# Patient Record
Sex: MALE | Race: WHITE | NOT HISPANIC OR LATINO
[De-identification: names, ages, dates, MRNs, and addresses within clinical notes are randomized per-mention and may not be internally consistent; named-entity substitution may affect disease eponyms.]

---

## 2023-04-28 ENCOUNTER — TRANSCRIPTION ENCOUNTER (OUTPATIENT)
Age: 58
End: 2023-04-28

## 2023-05-15 PROBLEM — Z00.00 ENCOUNTER FOR PREVENTIVE HEALTH EXAMINATION: Status: ACTIVE | Noted: 2023-05-15

## 2023-05-16 ENCOUNTER — APPOINTMENT (OUTPATIENT)
Dept: NEUROLOGY | Facility: CLINIC | Age: 58
End: 2023-05-16
Payer: MEDICARE

## 2023-05-16 VITALS
HEART RATE: 63 BPM | BODY MASS INDEX: 22.35 KG/M2 | SYSTOLIC BLOOD PRESSURE: 106 MMHG | HEIGHT: 72 IN | DIASTOLIC BLOOD PRESSURE: 66 MMHG | WEIGHT: 165 LBS

## 2023-05-16 DIAGNOSIS — Z86.39 PERSONAL HISTORY OF OTHER ENDOCRINE, NUTRITIONAL AND METABOLIC DISEASE: ICD-10-CM

## 2023-05-16 DIAGNOSIS — Z86.59 PERSONAL HISTORY OF OTHER MENTAL AND BEHAVIORAL DISORDERS: ICD-10-CM

## 2023-05-16 DIAGNOSIS — F31.70 BIPOLAR DISORDER, CURRENTLY IN REMISSION, MOST RECENT EPISODE UNSPECIFIED: ICD-10-CM

## 2023-05-16 PROCEDURE — 99215 OFFICE O/P EST HI 40 MIN: CPT

## 2023-05-17 PROBLEM — F31.70 HISTORY OF DEPRESSED BIPOLAR DISORDER: Status: RESOLVED | Noted: 2023-05-16 | Resolved: 2023-05-17

## 2023-05-17 PROBLEM — Z86.39 HISTORY OF HIGH CHOLESTEROL: Status: RESOLVED | Noted: 2023-05-16 | Resolved: 2023-05-17

## 2023-05-17 NOTE — DISCUSSION/SUMMARY
[FreeTextEntry1] : Julian is a pleasant 57-year-old man with a history of possible bipolar disorder, history of low CSF ? treated with blood patch (details unclear) who is presenting with new onset seizures. Had three seizures in one day (convulsions) with one captured on EEG of right frontotemporal onset. There was left head turn, figure of four and then a bilateral tonic clonic seizure. On  mg bid. Will admit to the EMU and hold EEG medications and observe EEG. MRI brain non-lesional. Exam significant for some ataxia, difficulty with tandem. \par \par No obvious provoking factor for the seizure. Having three seizures in one day, does not necessarily mean a diagnosis of epilepsy however the focality of the seizure (right frontotemporal) suggests that there may be recurrence. Will need more EEG data to make further conclusions. \par \par

## 2023-05-17 NOTE — ASSESSMENT
[FreeTextEntry1] : Continue levetiracetam 500 mg bid for now\par Admit to the EMU - hold levetiracetam, 48 hours, obseve for seizures/spikes\par Will likely transition to lamotrigine gradually after EMU\par Follow up with psychiatry\par No driving \par Discussed seizure safety in detail

## 2023-05-17 NOTE — PHYSICAL EXAM
[FreeTextEntry1] : \par MENTAL STATUS: Knows month, day and year. Knows the name of the hospital. Able to calculate number of quarters in $2.50. Able to do serial sevens with ease. Able to spell "world" backwards. 3/3 registration of 3 item, 3/3 recall at three minutes. \par LANG/SPEECH: speech fluent \par CRANIAL NERVES:\par II: Pupils equal and reactive, no VF deficits\par III, IV, VI: EOMI\par V: normal sensation in V1, V2, and V3 segments bilaterally\par VII: no asymmetry, no nasolabial fold flattening\par VIII: diminished hearing to voice\par IX, X: normal palatal elevation, no uvular deviation\par XI: 5/5 head turn and 5/5 shoulder shrug bilaterally\par XII: midline tongue protrusion\par MOTOR: No Drift in limbs; normal bulk and tone; strength 5/5 in all extremity                                       \par Sensory: Normal to touch, decreased vibratory sensation distally \par Reflexes: Equal and symmetric throughout\par COORD: ataxia with bilateral finger to nose\par STATION: + romberg\par GAIT: difficulty with tandem \par \par

## 2023-05-17 NOTE — DATA REVIEWED
[de-identified] : 4/28/23\par MRI brain seizure protocol \par Findings: \par \par  The bilateral hippocampi are symmetric in size without evidence of abnormal \par  signal changes. The temporal horns are also symmetric. \par \par  There are a few scattered punctate foci of T2 and Flair signal \par  hyperintensities within the bilateral frontal subcortical white matter that \par  are nonspecific. There is no evidence of mass-effect or midline shift. There \par  is no evidence of intra or extra-axial fluid collection. The ventricles are of \par  normal size and caliber. There is no evidence of acute infarction. Evaluation \par  of the intracranial vascular flow-voids appear within normal limits. \par \par  Susceptibility weighted images demonstrate no evidence of abnormal signal \par  changes within the brain parenchyma. \par \par  The visualized paranasal sinuses and bilateral mastoid air cells are clear. \par \par  Impression: \par \par  No evidence of mesial temporal sclerosis. \par \par  Nonspecific few scattered punctate foci of T2 and Flair signal \par  hyperintensities within the white matter; findings may be seen in patient with \par  migraine headaches, vasculitis, microvascular disease, demyelinating disease, \par  Lyme disease and viral illness. No evidence of acute infarction. \par  [de-identified] : 4/27/23 cEEG: inpatient  FINAL Summary:\par Abnormal continuous av-EEG study.\par 1) There was one electroclinical seizure captured, with onset over the right frontotemporal region and secondary \par generalization, lasting ~80 seconds.\par Final Clinical Correlation:\par One focal onset seizure captured as described above\par Yesenia Tompkins MD\par Attending Neurologist, [facility]\par

## 2023-05-17 NOTE — HISTORY OF PRESENT ILLNESS
[FreeTextEntry1] : Karla is a pleasant 57-year-old RH man with a history of bipolar disorder and cerebral  hypoperfusion? He was brought to the ED for a seizure. \par \par Upon arrival he wa afebrile. /100. Prolactin was 17.3, WNL.  Alcohol was WNL. Toxicology was positive for cannabis. In the ED he had a witnessed tonic-clonic seizure that lasted for 1 minute and he was loaded with LEV 1 g IV. As per nursing notes, he was turning blue, unresponsive with jerking movements of his extremities. Oxygen saturation decreased to 68%. He had a third seizure which was captured on EEG. This was of right frontotemporal onset and consisted of mouth movements followed by head turn \par to the left, figure of four, followed by clonic jerking. \par \par He presents to clinic with his sister Marina. As per his report, on 4/27/23, he went to work. He had to move a car for inspection. He was testing the lights with the . The  \par said he had a "grand mal seizure". The next  thing he knew he was in an ambulance strapped down. He was not sick, no fever, no stressors. He had a right-sided tongue bite and reports \par still feeling sore after the event. He had two more seizures in the hospital (3 in one day). \par \par He reports that his memory has been poor. No known history of COVID. Denies history of staring, behavioral arrest, viki vu, a rising sensation in the stomach. \par He is taking levetiracetam 500 mg bid. \par \par Years ago, he was told he had a Chiari malformation/ cerebral hypoperfusion with low CSF fluid and given a blood patch. He used to see Dr. Garcia, a neurosurgeon in [place]. \par He has had chronic difficulty with his gait and reports issues with his right eye with dizziness.\par \par Currently he takes levetiracetam 500 mg bid. Reports feeling a little slowed down.  \par \par Medications\par ezetimibe \par levetiracetam 500 mg bid\par seroquel 50 mg qhs\par propranolol 80 mg daily\par rosuvastatin 20 mg qd \par \par Social history\par Lives in [place]\par Was driving until seizures - not driving now\par Works part-time in a service station, cleaning \par Lives with mom\par No alcohol\par Smokes a little marijuana, daily , 1-2 hits , no gummies, no vape, no pipe\par No cocaine, no heroin\par \par Past Medical History\par Bipolar disorder - history of tommy\par No suicide attempts\par No hospitalization for depression \par \par Psychiatrist - none\par Therapist Francisco Luevano\par \par Surgeries\par Umbilical hernia surgery\par \par Developmental HIstory: \par Born on time\par No difficulties with birth \par No history of febrile seizures \par Never held back in school \par College degree - photography, data processing \par No history of meningitis or encephalitis \par No history of major head injury with loss of conscious\par No family history of seizures apart from a nephew (complication with birth, quadriplegic)\par \par \par EEG \par Study Start Date/Time: 4/27/2023, 12:54:38 PM\par Study End Date/Time: 4/28/2023, 8:08 AM\par Referred by: MILEY Jensen\par Brief Clinical History: KARLA MARIE is a 57 year old -; study performed to investigate for seizures or markers \par of epilepsy.\par Technologist notes: PT IN ER FOR A FIRST TIME SEIZURE\par PT HAS A HISTORY OF DEPRESION\par Diagnosis Code: R56.9 convulsions/seizure\par CPT: 19373 EEG with video 12-26h\par Pertinent Medications:\par n/a\par Acquisition Details:\par Electroencephalography was acquired using a minimum of 21 channels on an [employer] Neu[employer] system v 9.3.1 \par with electrode placement according to the standard International 10-20 system following ACNS (American Clinical \par Neurophysiology Society) guidelines for Long-Term Video EEG monitoring. Anterior temporal T1 and T2 electrodes \par were utilized whenever possible. The [employer] automated spike & seizure detections were all reviewed in detail, in \par addition to extensive portions of raw EEG.\par Day 1: 4/27/2023 @ 12:54:38 PM to next morning 8:08 AM\par Background: continuous, with predominantly alpha and beta frequencies.\par Symmetry: No persistent asymmetries of voltage or frequency.\par Posterior Dominant Rhythm: 9 Hz symmetric, well-organized, and well-modulated.\par Organization: Rudimentary.\par Voltage: Normal (20+ uV)\par Variability: Yes. Reactivity: Yes.\par N2 sleep: Symmetric, synchronous spindles and K complexes.\par Spontaneous Activity: No epileptiform discharges.\par Periodic/rhythmic activity: None.\par Events: There was one electroclinical seizure captured, with onset over the right frontotemporal region and secondary \par generalization, lasting ~80 seconds.\par d1 13:19:23 Seizure onset\par d1 13:19:24 semi-rhythmic theta over right frontotemporal region\par d1 13:19:28 moves head\par d1 13:19:31 increasing amplitude over right\par d1 13:19:32 mouth movements\par d1 13:19:40 bilateral theta with spikes\par d1 13:19:40 mouth movements continue\par d1 13:19:45 head turning to the left\par d1 13:19:56 diffuse muscle artifact\par d1 13:19:59 figure 4\par d1 13:20:24 clonic artifact\par d1 13:20:44 offset\par Provocations: Photic stimulation: did not evoke EEG abnormalities.\par FINAL Summary:\par Abnormal continuous av-EEG study.\par 1) There was one electroclinical seizure captured, with onset over the right frontotemporal region and secondary \par generalization, lasting ~80 seconds.\par Final Clinical Correlation:\par One focal onset seizure captured as described above\par Yesenia Tompkins MD\par Attending Neurologist, [facility]\par \par \par 4/28/23\par MRI brain seizure protocol \par Findings: \par \par  The bilateral hippocampi are symmetric in size without evidence of abnormal \par  signal changes. The temporal horns are also symmetric. \par \par  There are a few scattered punctate foci of T2 and Flair signal \par  hyperintensities within the bilateral frontal subcortical white matter that \par  are nonspecific. There is no evidence of mass-effect or midline shift. There \par  is no evidence of intra or extra-axial fluid collection. The ventricles are of \par  normal size and caliber. There is no evidence of acute infarction. Evaluation \par  of the intracranial vascular flow-voids appear within normal limits. \par \par  Susceptibility weighted images demonstrate no evidence of abnormal signal \par  changes within the brain parenchyma. \par \par  The visualized paranasal sinuses and bilateral mastoid air cells are clear. \par \par  Impression: \par \par  No evidence of mesial temporal sclerosis. \par \par  Nonspecific few scattered punctate foci of T2 and Flair signal \par  hyperintensities within the white matter; findings may be seen in patient with \par  migraine headaches, vasculitis, microvascular disease, demyelinating disease, \par  Lyme disease and viral illness. No evidence of acute infarction. \par

## 2023-05-17 NOTE — CONSULT LETTER
[Dear  ___] : Dear  [unfilled], [Courtesy Letter:] : I had the pleasure of seeing your patient, [unfilled], in my office today. [Please see my note below.] : Please see my note below. [Sincerely,] : Sincerely, [FreeTextEntry3] : Tanja Nash MD \par [facility] \par [phone]

## 2023-06-09 ENCOUNTER — RX RENEWAL (OUTPATIENT)
Age: 58
End: 2023-06-09

## 2023-06-14 RX ORDER — BRIVARACETAM 50 MG/1
50 TABLET, FILM COATED ORAL
Qty: 60 | Refills: 0 | Status: DISCONTINUED | COMMUNITY
Start: 2023-05-22 | End: 2023-06-14

## 2023-06-14 RX ORDER — BRIVARACETAM 100 MG/1
100 TABLET, FILM COATED ORAL
Qty: 60 | Refills: 1 | Status: DISCONTINUED | COMMUNITY
Start: 2023-06-09 | End: 2023-06-14

## 2023-06-23 ENCOUNTER — NON-APPOINTMENT (OUTPATIENT)
Age: 58
End: 2023-06-23

## 2023-06-29 ENCOUNTER — TRANSCRIPTION ENCOUNTER (OUTPATIENT)
Age: 58
End: 2023-06-29

## 2023-06-29 ENCOUNTER — NON-APPOINTMENT (OUTPATIENT)
Age: 58
End: 2023-06-29

## 2023-07-05 ENCOUNTER — APPOINTMENT (OUTPATIENT)
Dept: NEUROLOGY | Facility: CLINIC | Age: 58
End: 2023-07-05
Payer: MEDICARE

## 2023-07-05 VITALS
SYSTOLIC BLOOD PRESSURE: 118 MMHG | DIASTOLIC BLOOD PRESSURE: 73 MMHG | BODY MASS INDEX: 22.35 KG/M2 | OXYGEN SATURATION: 96 % | HEIGHT: 72 IN | WEIGHT: 165 LBS | HEART RATE: 63 BPM

## 2023-07-05 PROCEDURE — 99215 OFFICE O/P EST HI 40 MIN: CPT

## 2023-07-05 NOTE — ASSESSMENT
[FreeTextEntry1] : Week 1: take lamotrigine 25 mg in the morning and 25 mg at night\par Week 2: take lamotrigine 50 mg in the morning and 25 mg at night \par Week 3: take lamotrigine 50 mg in the morning and 50 mg at night\par Week 4: take lamotrigine 75 mg in the morning and 50 mg at night\par Week 5: take lamotrigine 75 mg twice a day\par Week 6: take lamotrigine 100 mg in the morning and 75 mg at night \par Week 7: take lamotrigine 100 mg twice a day\par \par Continue briviact 50 mg twice a day \par \par Labwork at next visit \par \par Ambulatory EEG in one month , return to clinic in one month - permission to overbook \par \par Please visit epilepsy foundation for information - 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 \par \par \par Discussed seizure safety in detail.\par \par Wear a helmet when biking or horseback riding\par No unsupervised swimming\par Showers rather than baths - no bath alone - risk of drowning \par Adjust the temperature on hot water heater to avoid scalding\par If uncontrolled seizures, use microwave rather than stovetop\par Don’t lock door in bathroom, bedroom or on places \par If fall off bed with seizures, try sleeping with mattress on the floor \par Use soft or padded furniture \par Avoid high ladders \par Take medication as prescribed\par Follow driving regulations of people with epilepsy. \par Information for seizures online: please visit Epilepsy [facility] \par

## 2023-07-05 NOTE — HISTORY OF PRESENT ILLNESS
[FreeTextEntry1] : Last seen in clinic on 5/16/23. Doing well. Was in the EMU from 6/27-6/29/23. \par EEG captured rare left anterior temporal sharp waves and spikes. \par \par EEG at that time captured 5 brief electrographic seizures of right anterior temporal onset out of sleep. These \par were subclinical and sometimes associated with arousal. Karla had no memory of them in the morning. \par Seizures were stopped with lorazepam and briviact load. He is discharged on new medication lamotrigine to take with briviact. \par \par He is doing okay. He feels less depressed/angry on briviact compared to levetiracetam but still does not feel great. He has a lot of environmental stressors and it is hard for him to say to what extent that is impacting his mood. There are many people in the home. Sometimes his patience is low. \par \par He is not drinking. He is not driving. He has some issues with his ears - psoriasis? both ears and eye issues. \par He has chronic issues with right-sided coordination. \par \par He has an upcoming appointment with Dr. Thorpe, neuro-ophthalmology. He would like me to forward my notes to Dr. Thorpe and his primary care doctor. \par \par His psychiatrist may be contacting me to discuss his case. \par \par Mediations include: \par briviact 50 mg bid \par lamotrigine 25 mg daily \par _______________________\par 5/16/23 \par Karla is a pleasant 57-year-old RH man with a history of bipolar disorder and cerebral  hypoperfusion? He was brought to the ED for a seizure. \par \par Upon arrival he wa afebrile. /100. Prolactin was 17.3, WNL.  Alcohol was WNL. Toxicology was positive for cannabis. In the ED he had a witnessed tonic-clonic seizure that lasted for 1 minute and he was loaded with LEV 1 g IV. As per nursing notes, he was turning blue, unresponsive with jerking movements of his extremities. Oxygen saturation decreased to 68%. He had a third seizure which was captured on EEG. This was of right frontotemporal onset and consisted of mouth movements followed by head turn \par to the left, figure of four, followed by clonic jerking. \par \par He presents to clinic with his sister Marina. As per his report, on 4/27/23, he went to work. He had to move a car for inspection. He was testing the lights with the . The  \par said he had a "grand mal seizure". The next  thing he knew he was in an ambulance strapped down. He was not sick, no fever, no stressors. He had a right-sided tongue bite and reports \par still feeling sore after the event. He had two more seizures in the hospital (3 in one day). \par \par He reports that his memory has been poor. No known history of COVID. Denies history of staring, behavioral arrest, viki vu, a rising sensation in the stomach. \par He is taking levetiracetam 500 mg bid. \par \par Years ago, he was told he had a Chiari malformation/ cerebral hypoperfusion with low CSF fluid and given a blood patch. He used to see Dr. Garcia, a neurosurgeon in [place]. \par He has had chronic difficulty with his gait and reports issues with his right eye with dizziness.\par \par Currently he takes levetiracetam 500 mg bid. Reports feeling a little slowed down.  \par \par Medications\par ezetimibe \par levetiracetam 500 mg bid\par seroquel 50 mg qhs\par propranolol 80 mg daily\par rosuvastatin 20 mg qd \par \par Social history\par Lives in [place]\par Was driving until seizures - not driving now\par Works part-time in a service station, cleaning \par Lives with mom\par No alcohol\par Smokes a little marijuana, daily , 1-2 hits , no gummies, no vape, no pipe\par No cocaine, no heroin\par \par Past Medical History\par Bipolar disorder - history of tommy\par No suicide attempts\par No hospitalization for depression \par \par Psychiatrist - none\par Therapist Francisco Luevano\par \par Surgeries\par Umbilical hernia surgery\par \par Developmental HIstory: \par Born on time\par No difficulties with birth \par No history of febrile seizures \par Never held back in school \par College degree - photography, data processing \par No history of meningitis or encephalitis \par No history of major head injury with loss of conscious\par No family history of seizures apart from a nephew (complication with birth, quadriplegic)\par \par \par EEG \par Study Start Date/Time: 4/27/2023, 12:54:38 PM\par Study End Date/Time: 4/28/2023, 8:08 AM\par Referred by: MILEY Jensen\par Brief Clinical History: KARLA MARIE is a 57 year old -; study performed to investigate for seizures or markers \par of epilepsy.\par Technologist notes: PT IN ER FOR A FIRST TIME SEIZURE\par PT HAS A HISTORY OF DEPRESION\par Diagnosis Code: R56.9 convulsions/seizure\par CPT: 69947 EEG with video 12-26h\par Pertinent Medications:\par n/a\par Acquisition Details:\par Electroencephalography was acquired using a minimum of 21 channels on an [employer] Neurology system v 9.3.1 \par with electrode placement according to the standard International 10-20 system following ACNS (American Clinical \par Neurophysiology Society) guidelines for Long-Term Video EEG monitoring. Anterior temporal T1 and T2 electrodes \par were utilized whenever possible. The [employer] automated spike & seizure detections were all reviewed in detail, in \par addition to extensive portions of raw EEG.\par Day 1: 4/27/2023 @ 12:54:38 PM to next morning 8:08 AM\par Background: continuous, with predominantly alpha and beta frequencies.\par Symmetry: No persistent asymmetries of voltage or frequency.\par Posterior Dominant Rhythm: 9 Hz symmetric, well-organized, and well-modulated.\par Organization: Rudimentary.\par Voltage: Normal (20+ uV)\par Variability: Yes. Reactivity: Yes.\par N2 sleep: Symmetric, synchronous spindles and K complexes.\par Spontaneous Activity: No epileptiform discharges.\par Periodic/rhythmic activity: None.\par Events: There was one electroclinical seizure captured, with onset over the right frontotemporal region and secondary \par generalization, lasting ~80 seconds.\par d1 13:19:23 Seizure onset\par d1 13:19:24 semi-rhythmic theta over right frontotemporal region\par d1 13:19:28 moves head\par d1 13:19:31 increasing amplitude over right\par d1 13:19:32 mouth movements\par d1 13:19:40 bilateral theta with spikes\par d1 13:19:40 mouth movements continue\par d1 13:19:45 head turning to the left\par d1 13:19:56 diffuse muscle artifact\par d1 13:19:59 figure 4\par d1 13:20:24 clonic artifact\par d1 13:20:44 offset\par Provocations: Photic stimulation: did not evoke EEG abnormalities.\par FINAL Summary:\par Abnormal continuous av-EEG study.\par 1) There was one electroclinical seizure captured, with onset over the right frontotemporal region and secondary \par generalization, lasting ~80 seconds.\par Final Clinical Correlation:\par One focal onset seizure captured as described above\par Yesenia Tompkins MD\par Attending Neurologist, [facility]\par \par \par 4/28/23\par MRI brain seizure protocol \par Findings: \par \par  The bilateral hippocampi are symmetric in size without evidence of abnormal \par  signal changes. The temporal horns are also symmetric. \par \par  There are a few scattered punctate foci of T2 and Flair signal \par  hyperintensities within the bilateral frontal subcortical white matter that \par  are nonspecific. There is no evidence of mass-effect or midline shift. There \par  is no evidence of intra or extra-axial fluid collection. The ventricles are of \par  normal size and caliber. There is no evidence of acute infarction. Evaluation \par  of the intracranial vascular flow-voids appear within normal limits. \par \par  Susceptibility weighted images demonstrate no evidence of abnormal signal \par  changes within the brain parenchyma. \par \par  The visualized paranasal sinuses and bilateral mastoid air cells are clear. \par \par  Impression: \par \par  No evidence of mesial temporal sclerosis. \par \par  Nonspecific few scattered punctate foci of T2 and Flair signal \par  hyperintensities within the white matter; findings may be seen in patient with \par  migraine headaches, vasculitis, microvascular disease, demyelinating disease, \par  Lyme disease and viral illness. No evidence of acute infarction. \par

## 2023-07-05 NOTE — PHYSICAL EXAM
[FreeTextEntry1] : Mental Status: AxOx3, euthymic affect, attention normal by serial sevens. Short term memory: immediate 3/3, after 3 minutes 3/3\par Language/Speech : speech fluent, can name, can repeat \par Cranial Nerves \par II: Pupils equal and reactive,  VFF full\par III, IV, VI: EOMI, slight right exophoria\par V : normal sensation in V1-V3 b/l\par VII : no asymmetry, no nasolabial fold flattening\par VIII: normal hearing to voice \par IX, X: normal palatal elevation, uvula midline\par XI: 5/5 head turn and 5/5 shoulder shrug bilaterally\par XII : midline tongue protrusion\par Motor: no drift in limbs, normal bulk and tone, 5/5 in all extremities, sustention tremor bilaterally \par Sensory: normal to light touch, pinprick and vibration\par Coordination: slight ataxia with right upper extremity \par Gait and station: difficulty with tandem, sways with romberg\par

## 2023-07-05 NOTE — DATA REVIEWED
[de-identified] : 4/28/23\par MRI brain seizure protocol \par Findings: \par \par  The bilateral hippocampi are symmetric in size without evidence of abnormal \par  signal changes. The temporal horns are also symmetric. \par \par  There are a few scattered punctate foci of T2 and Flair signal \par  hyperintensities within the bilateral frontal subcortical white matter that \par  are nonspecific. There is no evidence of mass-effect or midline shift. There \par  is no evidence of intra or extra-axial fluid collection. The ventricles are of \par  normal size and caliber. There is no evidence of acute infarction. Evaluation \par  of the intracranial vascular flow-voids appear within normal limits. \par \par  Susceptibility weighted images demonstrate no evidence of abnormal signal \par  changes within the brain parenchyma. \par \par  The visualized paranasal sinuses and bilateral mastoid air cells are clear. \par \par  Impression: \par \par  No evidence of mesial temporal sclerosis. \par \par  Nonspecific few scattered punctate foci of T2 and Flair signal \par  hyperintensities within the white matter; findings may be seen in patient with \par  migraine headaches, vasculitis, microvascular disease, demyelinating disease, \par  Lyme disease and viral illness. No evidence of acute infarction. \par  [de-identified] : 6/27-6/29/23: [facility]: 4 brief electrographic seizures or right anterior temporal onset out of sleep. These results in arousal from sleep but no other clinical correlate. Occasional right anterior temporal spikes and rare left anterior temporal sharp waves were seen, suggestive of an epileptic \par focus in this region.  \par 4/27/23 cEEG: inpatient  FINAL Summary:\par Abnormal continuous av-EEG study.\par 1) There was one electroclinical seizure captured, with onset over the right frontotemporal region and secondary \par generalization, lasting ~80 seconds.\par Final Clinical Correlation:\par One focal onset seizure captured as described above\par Yesenia Tompkins MD\par Attending Neurologist, [facility]\par  [de-identified] : 6/28/23 L vitamin b12 853, vitamin d 38.5, creatinine 0.8 AST, ALT within normal limits

## 2023-07-05 NOTE — CONSULT LETTER
[Dear  ___] : Dear ~KRZYSZTOF, [Courtesy Letter:] : I had the pleasure of seeing your patient, [unfilled], in my office today. [Please see my note below.] : Please see my note below. [Sincerely,] : Sincerely, [FreeTextEntry3] : Tanja Nash MD \par [facility]

## 2023-07-05 NOTE — DISCUSSION/SUMMARY
[FreeTextEntry1] : Julian is a pleasant 57-year-old man with a history of possible bipolar disorder, history of low CSF ? treated with blood patch (details unclear) who is presenting with new onset seizures. Had three seizures in one day (convulsions) with one captured on EEG of right frontotemporal onset. There was left head turn, figure of four and then a bilateral tonic clonic seizure. Was placed on  mg bid.. MRI brain non-lesional. Exam significant for some ataxia (R>L), difficulty with tandem. \par \par Admitted to [place] [facility] 6/27-6/29. Captured five seizures out of sleep of right anterior temporal onset. Seizures were mostly subclinical but did wake patient from sleep. He had no memory of them. They occurred when briviact was held. \par \par Will add lamotrigine. 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 Referred them to the epilepsy foundation for close follow-up. Goal will be to up-titrate lamotrigine to 200 mg bid and continue briviact for now. Will repeat EEG in one month. \par \par \par  Linear/Normal reasoning/Other

## 2023-07-10 RX ORDER — BRIVARACETAM 100 MG/1
100 TABLET, FILM COATED ORAL
Qty: 60 | Refills: 1 | Status: DISCONTINUED | COMMUNITY
Start: 2023-06-29 | End: 2023-07-10

## 2023-07-10 RX ORDER — LEVETIRACETAM 500 MG/1
500 TABLET, FILM COATED ORAL TWICE DAILY
Qty: 60 | Refills: 5 | Status: DISCONTINUED | COMMUNITY
Start: 2023-05-16 | End: 2023-07-10

## 2023-07-13 ENCOUNTER — TRANSCRIPTION ENCOUNTER (OUTPATIENT)
Age: 58
End: 2023-07-13

## 2023-07-17 ENCOUNTER — APPOINTMENT (OUTPATIENT)
Dept: NEUROLOGY | Facility: CLINIC | Age: 58
End: 2023-07-17

## 2023-07-19 RX ORDER — LAMOTRIGINE 25 MG/1
25 TABLET ORAL
Qty: 240 | Refills: 0 | Status: DISCONTINUED | COMMUNITY
Start: 2023-07-10 | End: 2023-07-19

## 2023-07-19 RX ORDER — LAMOTRIGINE 25 MG/1
25 TABLET ORAL
Qty: 120 | Refills: 0 | Status: DISCONTINUED | COMMUNITY
Start: 2023-06-29 | End: 2023-07-19

## 2023-07-31 ENCOUNTER — APPOINTMENT (OUTPATIENT)
Dept: NEUROLOGY | Facility: CLINIC | Age: 58
End: 2023-07-31
Payer: MEDICARE

## 2023-07-31 VITALS
HEIGHT: 72 IN | DIASTOLIC BLOOD PRESSURE: 83 MMHG | HEART RATE: 59 BPM | SYSTOLIC BLOOD PRESSURE: 143 MMHG | OXYGEN SATURATION: 96 % | BODY MASS INDEX: 22.35 KG/M2 | WEIGHT: 165 LBS

## 2023-07-31 PROCEDURE — 95816 EEG AWAKE AND DROWSY: CPT

## 2023-07-31 PROCEDURE — 99215 OFFICE O/P EST HI 40 MIN: CPT

## 2023-07-31 PROCEDURE — ZZZZZ: CPT

## 2023-07-31 NOTE — DISCUSSION/SUMMARY
[FreeTextEntry1] : Julian is a pleasant 57-year-old man with a history of possible bipolar disorder, history of low CSF ? treated with blood patch (details unclear) who is presenting with new onset seizures. Had three seizures in one day (convulsions) with one captured on EEG of right frontotemporal onset. There was left head turn, figure of four and then a bilateral tonic clonic seizure. Was placed on  mg bid.. MRI brain non-lesional. Exam significant for some ataxia (R>L), difficulty with tandem. EMU 6/2023 captured five seizures or right temporal onset out of sleep. Up-titrating lamotrigine.  Admitted to [facility] 6/27-6/29. Captured five seizures out of sleep of right anterior temporal onset. Seizures were mostly subclinical but did wake patient from sleep. He had no memory of them. They occurred when briviact was held.  Up-titrating lamotrigine.  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 Referred them to the epilepsy foundation for close follow-up. Goal will be to up-titrate lamotrigine to 200 mg bid and continue briviact for now. Repeat EEG today.

## 2023-07-31 NOTE — HISTORY OF PRESENT ILLNESS
[FreeTextEntry1] : Presenting with sister. Had a recent EMU visit at [place] from 6/27-6/29/23. 5 brief electrographic seizures of right anterior temporal onset were captured out of sleep. No definite clinical correlate. Karla was not aware of them. Rare left and right anterior temporal epileptiform potentials were noted. Currently, Karla is taking lamotrigine 50 mg in the morning and 25 mg at night and briviact 50 mg bid. The briviact causes him to feel lethargic. He has some anhedonia, not motivated to do things. A couple weeks before the EMU he woke up with a tongue bite and confusion. He has not had that recently.   He lives with mom. Enjoys watching the birds. Trying to drink plenty of water. Sometimes wakes up to go to the bathroom. Supportive large family.   Current medications include  rosuvastatin ezetimibe lamotrigine 50 mg in the morning and 25 mg at night brivact 50 mg twice a day        ___________ Last seen in clinic on 5/16/23. Doing well. Was in the EMU from 6/27-6/29/23.  EEG captured rare left anterior temporal sharp waves and spikes.   EEG at that time captured 5 brief electrographic seizures of right anterior temporal onset out of sleep. These  were subclinical and sometimes associated with arousal. Karla had no memory of them in the morning.  Seizures were stopped with lorazepam and briviact load. He is discharged on new medication lamotrigine to take with briviact.   He is doing okay. He feels less depressed/angry on briviact compared to levetiracetam but still does not feel great. He has a lot of environmental stressors and it is hard for him to say to what extent that is impacting his mood. There are many people in the home. Sometimes his patience is low.   He is not drinking. He is not driving. He has some issues with his ears - psoriasis? both ears and eye issues.  He has chronic issues with right-sided coordination.   He has an upcoming appointment with Dr. Thorpe, neuro-ophthalmology. He would like me to forward my notes to Dr. Thorpe and his primary care doctor.   His psychiatrist may be contacting me to discuss his case.   Mediations include:  briviact 50 mg bid  lamotrigine 25 mg daily  _______________________ 5/16/23  Karla is a pleasant 57-year-old  man with a history of bipolar disorder and cerebral  hypoperfusion? He was brought to the ED for a seizure.   Upon arrival he wa afebrile. /100. Prolactin was 17.3, WNL.  Alcohol was WNL. Toxicology was positive for cannabis. In the ED he had a witnessed tonic-clonic seizure that lasted for 1 minute and he was loaded with LEV 1 g IV. As per nursing notes, he was turning blue, unresponsive with jerking movements of his extremities. Oxygen saturation decreased to 68%. He had a third seizure which was captured on EEG. This was of right frontotemporal onset and consisted of mouth movements followed by head turn  to the left, figure of four, followed by clonic jerking.   He presents to clinic with his sister Marina. As per his report, on 4/27/23, he went to work. He had to move a car for inspection. He was testing the lights with the . The   said he had a "grand mal seizure". The next  thing he knew he was in an ambulance strapped down. He was not sick, no fever, no stressors. He had a right-sided tongue bite and reports  still feeling sore after the event. He had two more seizures in the hospital (3 in one day).   He reports that his memory has been poor. No known history of COVID. Denies history of staring, behavioral arrest, viki vu, a rising sensation in the stomach.  He is taking levetiracetam 500 mg bid.   Years ago, he was told he had a Chiari malformation/ cerebral hypoperfusion with low CSF fluid and given a blood patch. He used to see Dr. Garcia, a neurosurgeon in [place].  He has had chronic difficulty with his gait and reports issues with his right eye with dizziness.  Currently he takes levetiracetam 500 mg bid. Reports feeling a little slowed down.    Medications ezetimibe  levetiracetam 500 mg bid seroquel 50 mg qhs propranolol 80 mg daily rosuvastatin 20 mg qd   Social history Lives in [place] Was driving until seizures - not driving now Works part-time in a service station, cleaning  Lives with mom No alcohol Smokes a little marijuana, daily , 1-2 hits , no gummies, no vape, no pipe No cocaine, no heroin  Past Medical History Bipolar disorder - history of tommy No suicide attempts No hospitalization for depression   Psychiatrist - none Therapist Francisco Luevano  Surgeries Umbilical hernia surgery  Developmental HIstory:  Born on time No difficulties with birth  No history of febrile seizures  Never held back in school  College degree - photography, data processing  No history of meningitis or encephalitis  No history of major head injury with loss of conscious No family history of seizures apart from a nephew (complication with birth, quadriplegic)   EEG  Study Start Date/Time: 4/27/2023, 12:54:38 PM Study End Date/Time: 4/28/2023, 8:08 AM Referred by: MILEY Jensen Brief Clinical History: KARLA MARIE is a 57 year old -; study performed to investigate for seizures or markers  of epilepsy. Technologist notes: PT IN ER FOR A FIRST TIME SEIZURE PT HAS A HISTORY OF DEPRESION Diagnosis Code: R56.9 convulsions/seizure CPT: 44259 EEG with video 12-26h Pertinent Medications: n/a Acquisition Details: Electroencephalography was acquired using a minimum of 21 channels on an [employer] system v 9.3.1  with electrode placement according to the standard International 10-20 system following ACNS (American Clinical  Neurophysiology Society) guidelines for Long-Term Video EEG monitoring. Anterior temporal T1 and T2 electrodes  were utilized whenever possible. The [employer]TEK automated spike & seizure detections were all reviewed in detail, in  addition to extensive portions of raw EEG. Day 1: 4/27/2023 @ 12:54:38 PM to next morning 8:08 AM Background: continuous, with predominantly alpha and beta frequencies. Symmetry: No persistent asymmetries of voltage or frequency. Posterior Dominant Rhythm: 9 Hz symmetric, well-organized, and well-modulated. Organization: Rudimentary. Voltage: Normal (20+ uV) Variability: Yes. Reactivity: Yes. N2 sleep: Symmetric, synchronous spindles and K complexes. Spontaneous Activity: No epileptiform discharges. Periodic/rhythmic activity: None. Events: There was one electroclinical seizure captured, with onset over the right frontotemporal region and secondary  generalization, lasting ~80 seconds. d1 13:19:23 Seizure onset d1 13:19:24 semi-rhythmic theta over right frontotemporal region d1 13:19:28 moves head d1 13:19:31 increasing amplitude over right d1 13:19:32 mouth movements d1 13:19:40 bilateral theta with spikes d1 13:19:40 mouth movements continue d1 13:19:45 head turning to the left d1 13:19:56 diffuse muscle artifact d1 13:19:59 figure 4 d1 13:20:24 clonic artifact d1 13:20:44 offset Provocations: Photic stimulation: did not evoke EEG abnormalities. FINAL Summary: Abnormal continuous av-EEG study. 1) There was one electroclinical seizure captured, with onset over the right frontotemporal region and secondary  generalization, lasting ~80 seconds. Final Clinical Correlation: One focal onset seizure captured as described above Yesenia Tompkins MD Attending Neurologist, [facility]   4/28/23 MRI brain seizure protocol  Findings:    The bilateral hippocampi are symmetric in size without evidence of abnormal   signal changes. The temporal horns are also symmetric.    There are a few scattered punctate foci of T2 and Flair signal   hyperintensities within the bilateral frontal subcortical white matter that   are nonspecific. There is no evidence of mass-effect or midline shift. There   is no evidence of intra or extra-axial fluid collection. The ventricles are of   normal size and caliber. There is no evidence of acute infarction. Evaluation   of the intracranial vascular flow-voids appear within normal limits.    Susceptibility weighted images demonstrate no evidence of abnormal signal   changes within the brain parenchyma.    The visualized paranasal sinuses and bilateral mastoid air cells are clear.    Impression:    No evidence of mesial temporal sclerosis.    Nonspecific few scattered punctate foci of T2 and Flair signal   hyperintensities within the white matter; findings may be seen in patient with   migraine headaches, vasculitis, microvascular disease, demyelinating disease,   Lyme disease and viral illness. No evidence of acute infarction.

## 2023-07-31 NOTE — ASSESSMENT
[FreeTextEntry1] : Week 1: take lamotrigine 50 mg in the morning and 50 mg at night Week 2: take lamotrigine 75 mg in the morning and 50 mg at night Week 3: take lamotrigine 75 mg twice a day Week 4: take lamotrigine 100 mg in the morning and 75 mg at night  Week 5: take lamotrigine 100 mg twice a day Call me when you get to this dose - I will send 100 mg tablets to pharmacy   Call me one week after ambulatory EEG to review results  Continue briviact 50 mg bid.   Return to clinic in three months to see Dr. Tanja Nash

## 2023-08-01 PROCEDURE — 95719 EEG PHYS/QHP EA INCR W/O VID: CPT

## 2023-08-01 PROCEDURE — 95700 EEG CONT REC W/VID EEG TECH: CPT

## 2023-08-01 PROCEDURE — 95708 EEG WO VID EA 12-26HR UNMNTR: CPT

## 2023-08-02 ENCOUNTER — APPOINTMENT (OUTPATIENT)
Dept: NEUROLOGY | Facility: CLINIC | Age: 58
End: 2023-08-02

## 2023-09-19 ENCOUNTER — RX RENEWAL (OUTPATIENT)
Age: 58
End: 2023-09-19

## 2023-10-05 ENCOUNTER — APPOINTMENT (OUTPATIENT)
Dept: NEUROLOGY | Facility: CLINIC | Age: 58
End: 2023-10-05
Payer: MEDICARE

## 2023-10-05 VITALS
WEIGHT: 165 LBS | OXYGEN SATURATION: 96 % | DIASTOLIC BLOOD PRESSURE: 82 MMHG | SYSTOLIC BLOOD PRESSURE: 130 MMHG | BODY MASS INDEX: 22.35 KG/M2 | HEIGHT: 72 IN | HEART RATE: 54 BPM

## 2023-10-05 DIAGNOSIS — Z74.09 OTHER REDUCED MOBILITY: ICD-10-CM

## 2023-10-05 PROCEDURE — 99215 OFFICE O/P EST HI 40 MIN: CPT

## 2023-10-05 RX ORDER — PROPRANOLOL HYDROCHLORIDE 80 MG/1
80 CAPSULE, EXTENDED RELEASE ORAL
Qty: 90 | Refills: 0 | Status: DISCONTINUED | COMMUNITY
Start: 2023-05-16 | End: 2023-10-05

## 2023-10-19 DIAGNOSIS — R25.1 TREMOR, UNSPECIFIED: ICD-10-CM

## 2024-01-10 ENCOUNTER — APPOINTMENT (OUTPATIENT)
Dept: NEUROLOGY | Facility: CLINIC | Age: 59
End: 2024-01-10
Payer: MEDICARE

## 2024-01-10 PROCEDURE — 95819 EEG AWAKE AND ASLEEP: CPT

## 2024-01-11 PROCEDURE — 95700 EEG CONT REC W/VID EEG TECH: CPT

## 2024-01-11 PROCEDURE — 95719 EEG PHYS/QHP EA INCR W/O VID: CPT

## 2024-01-11 PROCEDURE — 95708 EEG WO VID EA 12-26HR UNMNTR: CPT

## 2024-01-12 ENCOUNTER — APPOINTMENT (OUTPATIENT)
Dept: NEUROLOGY | Facility: CLINIC | Age: 59
End: 2024-01-12

## 2024-01-26 ENCOUNTER — APPOINTMENT (OUTPATIENT)
Dept: NEUROLOGY | Facility: CLINIC | Age: 59
End: 2024-01-26
Payer: MEDICARE

## 2024-01-26 VITALS
OXYGEN SATURATION: 100 % | DIASTOLIC BLOOD PRESSURE: 68 MMHG | SYSTOLIC BLOOD PRESSURE: 118 MMHG | HEIGHT: 72 IN | BODY MASS INDEX: 22.35 KG/M2 | HEART RATE: 52 BPM | WEIGHT: 165 LBS

## 2024-01-26 DIAGNOSIS — E55.9 VITAMIN D DEFICIENCY, UNSPECIFIED: ICD-10-CM

## 2024-01-26 PROCEDURE — 99215 OFFICE O/P EST HI 40 MIN: CPT

## 2024-01-26 RX ORDER — PROPRANOLOL HCL 120 MG
120 CAPSULE, EXTENDED RELEASE 24HR ORAL
Refills: 0 | Status: ACTIVE | COMMUNITY

## 2024-01-26 RX ORDER — OMEGA-3/DHA/EPA/FISH OIL 1200 MG
1200 CAPSULE ORAL
Refills: 0 | Status: ACTIVE | COMMUNITY

## 2024-01-26 RX ORDER — EZETIMIBE 10 MG/1
TABLET ORAL
Refills: 0 | Status: DISCONTINUED | COMMUNITY
End: 2024-01-26

## 2024-01-26 RX ORDER — ROSUVASTATIN CALCIUM 5 MG/1
TABLET, FILM COATED ORAL
Refills: 0 | Status: DISCONTINUED | COMMUNITY
End: 2024-01-26

## 2024-01-26 RX ORDER — QUETIAPINE FUMARATE 50 MG/1
50 TABLET ORAL
Refills: 0 | Status: ACTIVE | COMMUNITY

## 2024-01-26 RX ORDER — ROSUVASTATIN CALCIUM 20 MG/1
20 TABLET, FILM COATED ORAL
Refills: 0 | Status: ACTIVE | COMMUNITY

## 2024-01-26 RX ORDER — EZETIMIBE 10 MG/1
10 TABLET ORAL
Refills: 0 | Status: ACTIVE | COMMUNITY

## 2024-01-26 RX ORDER — LAMOTRIGINE 25 MG/1
25 TABLET ORAL
Qty: 240 | Refills: 0 | Status: DISCONTINUED | COMMUNITY
Start: 2023-07-19 | End: 2024-01-26

## 2024-01-26 NOTE — DISCUSSION/SUMMARY
[FreeTextEntry1] : Julian is a pleasant 58-year-old man with a history of possible bipolar disorder, history of low CSF ? treated with blood patch (details unclear) who is presenting with new onset seizures. Had three seizures in one day (convulsions) with one captured on EEG of right frontotemporal onset. There was left head turn, figure of four and then a bilateral tonic clonic seizure. Was placed on  mg bid.. MRI brain non-lesional. Exam significant for some ataxia (R>L), difficulty with tandem. EMU 6/2023 captured five seizures or right temporal onset out of sleep. Up-titrating lamotrigine. Admitted to [place] [facility] 6/27-6/29. Captured five seizures out of sleep of right anterior temporal onset. Seizures were mostly subclinical but did wake patient from sleep. He had no memory of them. They occurred when briviact was held.  Up-titrating lamotrigine. 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 Referred them to the epilepsy foundation for close follow-up.  Cannot go up on lamotrigine given worsening ataxia. Will need to monitor for three months. If persistent symptoms, plan will be to go up on briviact to 100 mg bid and decrease lamotrigine to 50 mg bid. For now, he would like to keep things the same.   > 6 months since seizure. Will okay driving, short distances, during day provided that he takes medication as demonstrated by lab levels.

## 2024-01-26 NOTE — REASON FOR VISIT
[Follow-Up: _____] : a [unfilled] follow-up visit [FreeTextEntry1] : pt states mid back pain, and rashog right arm.

## 2024-01-26 NOTE — HISTORY OF PRESENT ILLNESS
[FreeTextEntry1] : Last seen in clinic on 10/5/23. Doing well. No seizures. Lives with mom. Eager to drive. Went to physical therapy in [place]. Unclear if he needs a new script or if he is even eligible for PT with insurance. States that it has helped. Does have some dizziness/incoordination that is worse about a hour or two after taking morning medications. Had a rash on right arm but it has stabilized. Here with sister to review results of ambulatory EEG.   Mediations failed:  levetiracetam   Current AED:  briviact 50 mg bid  lamotrigine 100 mg bid  _______________ 10/5/23  Last seen in clinic on July 31, 2023. Doing okay. Reports that he has had significant memory issues but thinks they are getting slightly better once he started medications. No obvious seizures. Does have some depression and family stressors. Also reports significant tremor in head and right> left hand. Tremor is so bad he has to eat with his left hand. He thinks it is worse since going up on lamotrigine. No seizures. Sleeping pretty good. Started on propranolol with his primary care doctor. Taking 120 mg ER? daily. Here with sister to review results of ambulatory EEG.   Current seizure medications:  birivact 50 mg twice aa day  lamotrigine 100 mg in morning and at night  ___________ 7/31/23  Last seen in clinic on 5/16/23. Doing well. Was in the EMU from 6/27-6/29/23.  EEG captured rare left anterior temporal sharp waves and spikes.   EEG at that time captured 5 brief electrographic seizures of right anterior temporal onset out of sleep. These  were subclinical and sometimes associated with arousal. Karla had no memory of them in the morning.  Seizures were stopped with lorazepam and briviact load. He is discharged on new medication lamotrigine to take with briviact.   He is doing okay. He feels less depressed/angry on briviact compared to levetiracetam but still does not feel great. He has a lot of environmental stressors and it is hard for him to say to what extent that is impacting his mood. There are many people in the home. Sometimes his patience is low.   He is not drinking. He is not driving. He has some issues with his ears - psoriasis? both ears and eye issues.  He has chronic issues with right-sided coordination.   He has an upcoming appointment with Dr. Thorpe, neuro-ophthalmology. He would like me to forward my notes to Dr. Thorpe and his primary care doctor.   His psychiatrist may be contacting me to discuss his case.   Mediations include:  briviact 50 mg bid  lamotrigine 25 mg daily  _______________________ 5/16/23  Karla is a pleasant 57-year-old  man with a history of bipolar disorder and cerebral  hypoperfusion? He was brought to the ED for a seizure.   Upon arrival he wa afebrile. /100. Prolactin was 17.3, WNL.  Alcohol was WNL. Toxicology was positive for cannabis. In the ED he had a witnessed tonic-clonic seizure that lasted for 1 minute and he was loaded with LEV 1 g IV. As per nursing notes, he was turning blue, unresponsive with jerking movements of his extremities. Oxygen saturation decreased to 68%. He had a third seizure which was captured on EEG. This was of right frontotemporal onset and consisted of mouth movements followed by head turn  to the left, figure of four, followed by clonic jerking.   He presents to clinic with his sister Marina. As per his report, on 4/27/23, he went to work. He had to move a car for inspection. He was testing the lights with the . The   said he had a "grand mal seizure". The next  thing he knew he was in an ambulance strapped down. He was not sick, no fever, no stressors. He had a right-sided tongue bite and reports  still feeling sore after the event. He had two more seizures in the hospital (3 in one day).   He reports that his memory has been poor. No known history of COVID. Denies history of staring, behavioral arrest, viki vu, a rising sensation in the stomach.  He is taking levetiracetam 500 mg bid.   Years ago, he was told he had a Chiari malformation/ cerebral hypoperfusion with low CSF fluid and given a blood patch. He used to see Dr. Garcia, a neurosurgeon in [place].  He has had chronic difficulty with his gait and reports issues with his right eye with dizziness.  Currently he takes levetiracetam 500 mg bid. Reports feeling a little slowed down.    Medications ezetimibe  levetiracetam 500 mg bid seroquel 50 mg qhs propranolol 80 mg daily rosuvastatin 20 mg qd   Social history Lives in [place] Was driving until seizures - not driving now Works part-time in a service station, cleaning  Lives with mom No alcohol Smokes a little marijuana, daily , 1-2 hits , no gummies, no vape, no pipe No cocaine, no heroin  Past Medical History Bipolar disorder - history of tommy No suicide attempts No hospitalization for depression   Psychiatrist - none Therapist Francisco Luevano  Surgeries Umbilical hernia surgery  Developmental HIstory:  Born on time No difficulties with birth  No history of febrile seizures  Never held back in school  College degree - photography, data processing  No history of meningitis or encephalitis  No history of major head injury with loss of conscious No family history of seizures apart from a nephew (complication with birth, quadriplegic)   EEG  Study Start Date/Time: 4/27/2023, 12:54:38 PM Study End Date/Time: 4/28/2023, 8:08 AM Referred by: MILEY Jensen Brief Clinical History: KARLA MARIE is a 57 year old -; study performed to investigate for seizures or markers  of epilepsy. Technologist notes: PT IN ER FOR A FIRST TIME SEIZURE PT HAS A HISTORY OF DEPRESION Diagnosis Code: R56.9 convulsions/seizure CPT: 78733 EEG with video 12-26h Pertinent Medications: n/a Acquisition Details: Electroencephalography was acquired using a minimum of 21 channels on an [employer] system v 9.3.1  with electrode placement according to the standard International 10-20 system following ACNS (American Clinical  Neurophysiology Society) guidelines for Long-Term Video EEG monitoring. Anterior temporal T1 and T2 electrodes  were utilized whenever possible. The [employer] automated spike & seizure detections were all reviewed in detail, in  addition to extensive portions of raw EEG. Day 1: 4/27/2023 @ 12:54:38 PM to next morning 8:08 AM Background: continuous, with predominantly alpha and beta frequencies. Symmetry: No persistent asymmetries of voltage or frequency. Posterior Dominant Rhythm: 9 Hz symmetric, well-organized, and well-modulated. Organization: Rudimentary. Voltage: Normal (20+ uV) Variability: Yes. Reactivity: Yes. N2 sleep: Symmetric, synchronous spindles and K complexes. Spontaneous Activity: No epileptiform discharges. Periodic/rhythmic activity: None. Events: There was one electroclinical seizure captured, with onset over the right frontotemporal region and secondary  generalization, lasting ~80 seconds. d1 13:19:23 Seizure onset d1 13:19:24 semi-rhythmic theta over right frontotemporal region d1 13:19:28 moves head d1 13:19:31 increasing amplitude over right d1 13:19:32 mouth movements d1 13:19:40 bilateral theta with spikes d1 13:19:40 mouth movements continue d1 13:19:45 head turning to the left d1 13:19:56 diffuse muscle artifact d1 13:19:59 figure 4 d1 13:20:24 clonic artifact d1 13:20:44 offset Provocations: Photic stimulation: did not evoke EEG abnormalities. FINAL Summary: Abnormal continuous av-EEG study. 1) There was one electroclinical seizure captured, with onset over the right frontotemporal region and secondary  generalization, lasting ~80 seconds. Final Clinical Correlation: One focal onset seizure captured as described above Yesenia Tompkins MD Attending Neurologist, [facility]   4/28/23 MRI brain seizure protocol  Findings:    The bilateral hippocampi are symmetric in size without evidence of abnormal   signal changes. The temporal horns are also symmetric.    There are a few scattered punctate foci of T2 and Flair signal   hyperintensities within the bilateral frontal subcortical white matter that   are nonspecific. There is no evidence of mass-effect or midline shift. There   is no evidence of intra or extra-axial fluid collection. The ventricles are of   normal size and caliber. There is no evidence of acute infarction. Evaluation   of the intracranial vascular flow-voids appear within normal limits.    Susceptibility weighted images demonstrate no evidence of abnormal signal   changes within the brain parenchyma.    The visualized paranasal sinuses and bilateral mastoid air cells are clear.    Impression:    No evidence of mesial temporal sclerosis.    Nonspecific few scattered punctate foci of T2 and Flair signal   hyperintensities within the white matter; findings may be seen in patient with   migraine headaches, vasculitis, microvascular disease, demyelinating disease,   Lyme disease and viral illness. No evidence of acute infarction.

## 2024-01-26 NOTE — DATA REVIEWED
[de-identified] : 4/28/23 MRI brain seizure protocol  Findings:    The bilateral hippocampi are symmetric in size without evidence of abnormal   signal changes. The temporal horns are also symmetric.    There are a few scattered punctate foci of T2 and Flair signal   hyperintensities within the bilateral frontal subcortical white matter that   are nonspecific. There is no evidence of mass-effect or midline shift. There   is no evidence of intra or extra-axial fluid collection. The ventricles are of   normal size and caliber. There is no evidence of acute infarction. Evaluation   of the intracranial vascular flow-voids appear within normal limits.    Susceptibility weighted images demonstrate no evidence of abnormal signal   changes within the brain parenchyma.    The visualized paranasal sinuses and bilateral mastoid air cells are clear.    Impression:    No evidence of mesial temporal sclerosis.    Nonspecific few scattered punctate foci of T2 and Flair signal   hyperintensities within the white matter; findings may be seen in patient with   migraine headaches, vasculitis, microvascular disease, demyelinating disease,   Lyme disease and viral illness. No evidence of acute infarction.   [de-identified] : 1/10/24: Ambulatory EEG: normal, routine EEG normal  7/31/23: Ambulatory EEG: normal, routine EEG normal  6/27-6/29/23: EMU [facility]: 4 brief electrographic seizures or right anterior temporal onset out of sleep. These results in arousal from sleep but no other clinical correlate. Occasional right anterior temporal spikes and rare left anterior temporal sharp waves were seen, suggestive of an epileptic  focus in this region.   4/27/23 cEEG: inpatient  FINAL Summary: Abnormal continuous av-EEG study. 1) There was one electroclinical seizure captured, with onset over the right frontotemporal region and secondary  generalization, lasting ~80 seconds. Final Clinical Correlation: One focal onset seizure captured as described above Yesenia Tompkins MD Attending Neurologist, [facility]  [de-identified] : 6/28/23 L vitamin b12 853, vitamin d 38.5, creatinine 0.8 AST, ALT within normal limits

## 2024-01-26 NOTE — ASSESSMENT
[FreeTextEntry1] : Please continue :  briviact 50 mg every 12 hours lamotrigine 100 mg every 12 hours  Please get labwork Physical and occupational therapy referral   Driving okay, short distances, only during the day No driving if not feeling well RTC in 5 months to see Dr. Tanja Nash   Seizure Safety:   Wear a helmet when biking or horseback riding No unsupervised swimming Showers rather than baths - no bath alone - risk of drowning  Adjust the temperature on hot water heater to avoid scalding If uncontrolled seizures, use microwave rather than stovetop Dont lock door in bathroom, bedroom or on places  If fall off bed with seizures, try sleeping with mattress on the floor  Use soft or padded furniture  Avoid high ladders  Take medication as prescribed Follow driving regulations of people with epilepsy.  Please visit [facility] : [email or web address]

## 2024-01-26 NOTE — PHYSICAL EXAM
[FreeTextEntry1] : Mental Status: speech fluent, 2/3 recall of 3 items at threeminutes Language/Speech : speech fluent  Cranial Nerves  II: Pupils equal and reactive,  VFF full III, IV, VI: EOMI V : normal sensation in V1-V3 b/l VII : no asymmetry, no nasolabial fold flattening VIII: normal hearing to voice  IX, X: normal palatal elevation, uvula midline XI: 5/5 head turn and 5/5 shoulder shrug bilaterally XII : midline tongue protrusion Motor: no drift in limbs, normal bulk and tone, 5/5 in all extremities, bilateral sustention tremor Sensory: normal to light touch  Coordination: significant ataxia with R>L FNF  Gait: difficulty with tandem , sways with romberg

## 2024-01-29 LAB
25(OH)D3 SERPL-MCNC: 45.6 NG/ML
ALBUMIN SERPL ELPH-MCNC: 4.6 G/DL
ALP BLD-CCNC: 92 U/L
ALT SERPL-CCNC: 42 U/L
ANION GAP SERPL CALC-SCNC: 12 MMOL/L
AST SERPL-CCNC: 34 U/L
BILIRUB SERPL-MCNC: 0.2 MG/DL
BUN SERPL-MCNC: 12 MG/DL
CALCIUM SERPL-MCNC: 9.6 MG/DL
CHLORIDE SERPL-SCNC: 104 MMOL/L
CO2 SERPL-SCNC: 28 MMOL/L
CREAT SERPL-MCNC: 0.78 MG/DL
EGFR: 102 ML/MIN/1.73M2
GLUCOSE SERPL-MCNC: 76 MG/DL
POTASSIUM SERPL-SCNC: 4.5 MMOL/L
PROT SERPL-MCNC: 6.4 G/DL
SODIUM SERPL-SCNC: 144 MMOL/L
VIT B12 SERPL-MCNC: 1089 PG/ML

## 2024-01-30 LAB — LAMOTRIGINE SERPL-MCNC: 6.1 UG/ML

## 2024-02-06 LAB — BRIVARACETAM SERPL-MCNC: 1.35 UG/ML

## 2024-02-27 RX ORDER — LAMOTRIGINE 100 MG/1
100 TABLET ORAL
Qty: 180 | Refills: 3 | Status: ACTIVE | COMMUNITY
Start: 2023-08-28 | End: 1900-01-01

## 2024-03-01 ENCOUNTER — RX RENEWAL (OUTPATIENT)
Age: 59
End: 2024-03-01

## 2024-04-03 ENCOUNTER — APPOINTMENT (OUTPATIENT)
Dept: NEUROLOGY | Facility: CLINIC | Age: 59
End: 2024-04-03

## 2024-05-30 ENCOUNTER — RX RENEWAL (OUTPATIENT)
Age: 59
End: 2024-05-30

## 2024-05-30 RX ORDER — BRIVARACETAM 50 MG/1
50 TABLET, FILM COATED ORAL
Qty: 60 | Refills: 2 | Status: ACTIVE | COMMUNITY
Start: 2023-06-14 | End: 1900-01-01

## 2024-06-26 ENCOUNTER — APPOINTMENT (OUTPATIENT)
Dept: NEUROLOGY | Facility: CLINIC | Age: 59
End: 2024-06-26
Payer: MEDICARE

## 2024-06-26 VITALS
OXYGEN SATURATION: 95 % | SYSTOLIC BLOOD PRESSURE: 112 MMHG | HEIGHT: 72 IN | BODY MASS INDEX: 22.35 KG/M2 | HEART RATE: 60 BPM | WEIGHT: 165 LBS | DIASTOLIC BLOOD PRESSURE: 66 MMHG

## 2024-06-26 DIAGNOSIS — G40.109 LOCALIZATION-RELATED (FOCAL) (PARTIAL) SYMPTOMATIC EPILEPSY AND EPILEPTIC SYNDROMES WITH SIMPLE PARTIAL SEIZURES, NOT INTRACTABLE, W/OUT STATUS EPILEPTICUS: ICD-10-CM

## 2024-06-26 PROCEDURE — G2211 COMPLEX E/M VISIT ADD ON: CPT

## 2024-06-26 PROCEDURE — 99215 OFFICE O/P EST HI 40 MIN: CPT

## 2024-06-26 RX ORDER — ZONISAMIDE 50 MG/1
50 CAPSULE ORAL
Qty: 30 | Refills: 2 | Status: ACTIVE | COMMUNITY
Start: 2024-06-26 | End: 1900-01-01

## 2024-09-02 ENCOUNTER — RX RENEWAL (OUTPATIENT)
Age: 59
End: 2024-09-02

## 2024-11-13 ENCOUNTER — APPOINTMENT (OUTPATIENT)
Dept: NEUROLOGY | Facility: CLINIC | Age: 59
End: 2024-11-13
Payer: MEDICARE

## 2024-11-13 VITALS
OXYGEN SATURATION: 98 % | WEIGHT: 165 LBS | BODY MASS INDEX: 22.38 KG/M2 | SYSTOLIC BLOOD PRESSURE: 125 MMHG | DIASTOLIC BLOOD PRESSURE: 82 MMHG | HEART RATE: 54 BPM

## 2024-11-13 PROCEDURE — 99214 OFFICE O/P EST MOD 30 MIN: CPT

## 2024-11-13 RX ORDER — LAMOTRIGINE 50 MG/1
50 TABLET ORAL
Qty: 180 | Refills: 3 | Status: ACTIVE | COMMUNITY
Start: 2024-11-13 | End: 1900-01-01

## 2025-03-03 ENCOUNTER — APPOINTMENT (OUTPATIENT)
Dept: NEUROLOGY | Facility: CLINIC | Age: 60
End: 2025-03-03
Payer: MEDICARE

## 2025-03-03 VITALS
BODY MASS INDEX: 21.67 KG/M2 | OXYGEN SATURATION: 98 % | SYSTOLIC BLOOD PRESSURE: 120 MMHG | HEART RATE: 75 BPM | DIASTOLIC BLOOD PRESSURE: 76 MMHG | HEIGHT: 72 IN | WEIGHT: 160 LBS

## 2025-03-03 DIAGNOSIS — G40.109 LOCALIZATION-RELATED (FOCAL) (PARTIAL) SYMPTOMATIC EPILEPSY AND EPILEPTIC SYNDROMES WITH SIMPLE PARTIAL SEIZURES, NOT INTRACTABLE, W/OUT STATUS EPILEPTICUS: ICD-10-CM

## 2025-03-03 PROCEDURE — 99214 OFFICE O/P EST MOD 30 MIN: CPT

## 2025-03-03 RX ORDER — BRIVARACETAM 75 MG/1
75 TABLET, FILM COATED ORAL
Qty: 60 | Refills: 3 | Status: ACTIVE | COMMUNITY
Start: 2025-03-03 | End: 1900-01-01

## 2025-03-05 ENCOUNTER — APPOINTMENT (OUTPATIENT)
Dept: NEUROLOGY | Facility: CLINIC | Age: 60
End: 2025-03-05
Payer: MEDICARE

## 2025-03-05 PROCEDURE — 95816 EEG AWAKE AND DROWSY: CPT

## 2025-03-06 ENCOUNTER — APPOINTMENT (OUTPATIENT)
Dept: NEUROLOGY | Facility: CLINIC | Age: 60
End: 2025-03-06

## 2025-03-06 PROCEDURE — 95708 EEG WO VID EA 12-26HR UNMNTR: CPT

## 2025-03-06 PROCEDURE — 95719 EEG PHYS/QHP EA INCR W/O VID: CPT

## 2025-03-06 PROCEDURE — 95700 EEG CONT REC W/VID EEG TECH: CPT

## 2025-06-02 ENCOUNTER — NON-APPOINTMENT (OUTPATIENT)
Age: 60
End: 2025-06-02

## 2025-07-29 ENCOUNTER — APPOINTMENT (OUTPATIENT)
Dept: NEUROLOGY | Facility: CLINIC | Age: 60
End: 2025-07-29
Payer: MEDICARE

## 2025-07-29 VITALS
DIASTOLIC BLOOD PRESSURE: 77 MMHG | BODY MASS INDEX: 22.51 KG/M2 | OXYGEN SATURATION: 94 % | WEIGHT: 166 LBS | SYSTOLIC BLOOD PRESSURE: 142 MMHG | HEART RATE: 55 BPM

## 2025-07-29 DIAGNOSIS — M54.32 SCIATICA, LEFT SIDE: ICD-10-CM

## 2025-07-29 DIAGNOSIS — G40.109 LOCALIZATION-RELATED (FOCAL) (PARTIAL) SYMPTOMATIC EPILEPSY AND EPILEPTIC SYNDROMES WITH SIMPLE PARTIAL SEIZURES, NOT INTRACTABLE, W/OUT STATUS EPILEPTICUS: ICD-10-CM

## 2025-07-29 PROCEDURE — 99214 OFFICE O/P EST MOD 30 MIN: CPT

## 2025-07-29 RX ORDER — LAMOTRIGINE 50 MG/1
50 TABLET ORAL
Qty: 180 | Refills: 2 | Status: ACTIVE | COMMUNITY
Start: 2025-07-29 | End: 1900-01-01

## 2025-08-04 LAB
25(OH)D3 SERPL-MCNC: 44.5 NG/ML
ALBUMIN SERPL ELPH-MCNC: 4.4 G/DL
ALP BLD-CCNC: 88 U/L
ALT SERPL-CCNC: 24 U/L
ANION GAP SERPL CALC-SCNC: 13 MMOL/L
AST SERPL-CCNC: 35 U/L
BILIRUB SERPL-MCNC: 0.2 MG/DL
BUN SERPL-MCNC: 12 MG/DL
CALCIUM SERPL-MCNC: 9.8 MG/DL
CHLORIDE SERPL-SCNC: 107 MMOL/L
CO2 SERPL-SCNC: 23 MMOL/L
CREAT SERPL-MCNC: 0.84 MG/DL
EGFRCR SERPLBLD CKD-EPI 2021: 99 ML/MIN/1.73M2
FOLATE SERPL-MCNC: >20 NG/ML
GLUCOSE SERPL-MCNC: 91 MG/DL
HCT VFR BLD CALC: 40.2 %
HGB BLD-MCNC: 13 G/DL
LAMOTRIGINE SERPL-MCNC: 3.7 UG/ML
MCHC RBC-ENTMCNC: 32.3 G/DL
MCHC RBC-ENTMCNC: 32.3 PG
MCV RBC AUTO: 99.8 FL
PLATELET # BLD AUTO: 176 K/UL
POTASSIUM SERPL-SCNC: 4 MMOL/L
PROT SERPL-MCNC: 6.8 G/DL
RBC # BLD: 4.03 M/UL
RBC # FLD: 14.1 %
SODIUM SERPL-SCNC: 143 MMOL/L
VIT B12 SERPL-MCNC: 837 PG/ML
WBC # FLD AUTO: 8.48 K/UL

## 2025-08-05 LAB — BRIVARACETAM SERPL-MCNC: 2.04 UG/ML

## 2025-08-05 RX ORDER — LAMOTRIGINE 25 MG/1
25 TABLET ORAL
Refills: 0 | Status: DISCONTINUED | COMMUNITY
End: 2025-08-05

## 2025-08-18 RX ORDER — GABAPENTIN 300 MG/1
300 CAPSULE ORAL EVERY 8 HOURS
Qty: 90 | Refills: 0 | Status: ACTIVE | COMMUNITY
Start: 2025-08-18 | End: 1900-01-01

## 2025-09-16 ENCOUNTER — APPOINTMENT (OUTPATIENT)
Dept: PAIN MANAGEMENT | Facility: CLINIC | Age: 60
End: 2025-09-16
Payer: MEDICARE

## 2025-09-16 VITALS
HEART RATE: 72 BPM | BODY MASS INDEX: 21.67 KG/M2 | DIASTOLIC BLOOD PRESSURE: 71 MMHG | SYSTOLIC BLOOD PRESSURE: 123 MMHG | WEIGHT: 160 LBS | HEIGHT: 72 IN | OXYGEN SATURATION: 96 %

## 2025-09-16 DIAGNOSIS — M54.16 RADICULOPATHY, LUMBAR REGION: ICD-10-CM

## 2025-09-16 PROCEDURE — 99204 OFFICE O/P NEW MOD 45 MIN: CPT

## 2025-09-19 ENCOUNTER — RESULT REVIEW (OUTPATIENT)
Age: 60
End: 2025-09-19